# Patient Record
Sex: FEMALE | Race: WHITE
[De-identification: names, ages, dates, MRNs, and addresses within clinical notes are randomized per-mention and may not be internally consistent; named-entity substitution may affect disease eponyms.]

---

## 2018-03-28 ENCOUNTER — HOSPITAL ENCOUNTER (EMERGENCY)
Dept: HOSPITAL 97 - ER | Age: 68
Discharge: HOME | End: 2018-03-28
Payer: COMMERCIAL

## 2018-03-28 DIAGNOSIS — M79.661: Primary | ICD-10-CM

## 2018-03-28 DIAGNOSIS — Z88.0: ICD-10-CM

## 2018-03-28 DIAGNOSIS — E10.9: ICD-10-CM

## 2018-03-28 DIAGNOSIS — I10: ICD-10-CM

## 2018-03-28 PROCEDURE — 93971 EXTREMITY STUDY: CPT

## 2018-03-28 PROCEDURE — 99284 EMERGENCY DEPT VISIT MOD MDM: CPT

## 2018-03-28 PROCEDURE — 96372 THER/PROPH/DIAG INJ SC/IM: CPT

## 2018-03-28 NOTE — ER
Nurse's Notes                                                                                     

 [facility]                                                                

Name: Marie Gipson                                                                              

Age: 67 yrs                                                                                       

Sex: Female                                                                                       

: 1950                                                                                   

MRN: [id number]                                                                                   

Arrival Date: 2018                                                                          

Time: 12:51                                                                                       

Account#: [id number]                                                                            

Bed 14                                                                                            

Private MD:                                                                                       

Diagnosis: Pain in right knee;Pain in right lower leg;Pain in right leg;Type 1 diabetes           

  mellitus;Essential (primary) hypertension                                                       

                                                                                                  

Presentation:                                                                                     

                                                                                             

13:10 Presenting complaint: Patient states: Right leg pain x 1 month, worse over last 2 days. hb  

      Pain starts in upper calf and radiates to foot and hip. Denies injury. Transition of        

      care: patient was not received from another setting of care. Onset of symptoms is           

      unknown. Care prior to arrival: None.                                                       

13:10 Method Of Arrival: Ambulatory                                                           hb  

13:10 Acuity: USSIE 3                                                                           hb  

                                                                                                  

Triage Assessment:                                                                                

13:15 General: Appears in no apparent distress. uncomfortable, Behavior is calm, cooperative. hb  

      Pain: Pain currently is 8 out of 10 on a pain scale. Neuro: Level of Consciousness is       

      awake, alert, obeys commands, Oriented to person, place, time, situation.                   

      Cardiovascular: Capillary refill < 3 seconds Patient's skin is warm and dry.                

      Respiratory: Airway is patent Respiratory effort is even, unlabored, Respiratory            

      pattern is regular, symmetrical.                                                            

                                                                                                  

Historical:                                                                                       

- Allergies:                                                                                      

13:13 PENICILLINS;                                                                            hb  

- PMHx:                                                                                           

13:13 Hypertension; Diabetes - IDDM;                                                          hb  

                                                                                                  

- Immunization history:: Adult Immunizations up to date.                                          

- Social history:: Smoking status: Patient/guardian denies using tobacco.                         

- Family history:: not pertinent.                                                                 

                                                                                                  

                                                                                                  

Screenin:40 Abuse screen: Denies threats or abuse. Denies injuries from another. Nutritional        ph  

      screening: No deficits noted. Tuberculosis screening: No symptoms or risk factors           

      identified. Fall Risk No fall in past 12 months (0 pts). No secondary diagnosis (0          

      pts). IV access (20 points). Ambulatory Aid- None/Bed Rest/Nurse Assist (0 pts). Gait-      

      Normal/Bed Rest/Wheelchair (0 pts) Mental Status- Oriented to own ability (0 pts).          

      Total Youngblood Fall Scale indicates No Risk (0-24 pts).                                        

                                                                                                  

Assessment:                                                                                       

13:46 General: Appears in no apparent distress. comfortable, well groomed, Behavior is calm,  ph  

      cooperative, appropriate for age, Denies fever, feeling ill. Pain: Complains of pain in     

      posterior aspect of right knee Pain radiates to right calf. Neuro: Level of                 

      Consciousness is awake, alert, obeys commands, Oriented to person, place, time,             

      situation. Cardiovascular: Denies chest pain, lightheadedness, shortness of breath,         

      Capillary refill < 3 seconds in bilateral fingers toes Patient's skin is warm and dry.      

      Pulses are palpable in right dorsalis pedis artery and left dorsalis pedis artery.          

      Respiratory: Airway is patent Respiratory effort is even, unlabored. Derm: Skin is          

      intact, is healthy with good turgor, Skin is pink, warm \T\ dry. Musculoskeletal:           

      Circulation, motion, and sensation intact. Range of motion: intact in all extremities,      

      Swelling present in right knee Denies known injury.                                         

14:48 Reassessment: Patient appears in no apparent distress at this time. Patient and/or      ph  

      family updated on plan of care and expected duration. Pain level reassessed. Patient is     

      alert, oriented x 3, equal unlabored respirations, skin warm/dry/pink. Pt returned from     

      radiology, awaiting results.                                                                

15:46 Reassessment: Patient appears in no apparent distress at this time. Patient and/or      ph  

      family updated on plan of care and expected duration. Pain level reassessed. Patient is     

      alert, oriented x 3, equal unlabored respirations, skin warm/dry/pink. Knee immobilizer     

      placed to R leg per provider order, pt instructed to follow-up w/ orthopedist and           

      discharged home with family.                                                                

                                                                                                  

Vital Signs:                                                                                      

13:14  / 69; Pulse 64; Resp 16; Temp 99.2; Pulse Ox 98% on R/A; Weight 88.45 kg; Height hb  

      5 ft. 1 in. (154.94 cm); Pain 8/10;                                                         

14:15  / 72; Pulse 67; Resp 18; Pulse Ox 99% on R/A;                                    ph  

15:48  / 74; Pulse 61; Resp 18; Temp 98.0; Pulse Ox 99% on R/A; Pain 6/10;              ph  

13:14 Body Mass Index 36.84 (88.45 kg, 154.94 cm)                                             hb  

                                                                                                  

ED Course:                                                                                        

12:51 Patient arrived in ED.                                                                  as  

13:13 Triage completed.                                                                       hb  

13:14 Arm band placed on left wrist.                                                          hb  

13:18 Saad Lozada MD is Attending Physician.                                             will 

13:40 Kirsty Hernandez, RN is Primary Nurse.                                                    ph  

13:40 Patient has correct armband on for positive identification. Placed in gown. Bed in low  ph  

      position. Call light in reach. Side rails up X 1. Pulse ox on. NIBP on. Warm blanket        

      given.                                                                                      

14:16 Patient taken to ultrasound. via wheelchair.                                            hr  

14:16 Note: Pt. currently at Ultrasound - will bring to radiology when finished with Pt..     jb2 

14:32 Ultrasound completed. Patient tolerated well.                                           hr  

14:32 Patient moved to radiology via wheelchair.                                              hr  

14:33 Extremity Venous Uni Ltd In Process Unspecified.                                        EDMS

14:38 Patient moved to radiology via wheelchair.                                              jb2 

14:38 X-ray completed. Patient tolerated procedure well.                                      jb2 

14:39 Patient moved back from radiology.                                                      jb2 

14:39 Knee Right 3 View In Process Unspecified.                                               EDMS

15:23 Quentin Phillips MD is Referral Physician.                                              will 

15:47 No provider procedures requiring assistance completed. Patient did not have IV access   ph  

      during this emergency room visit. Knee immobilizer applied on right knee.                   

                                                                                                  

Administered Medications:                                                                         

14:07 Drug: Norco 10 mg-325 mg 1 tabs Route: PO;                                              ph  

15:49 Follow up: Response: No adverse reaction; Pain is decreased                             ph  

14:07 Drug: TORadol 60 mg Route: IM; Site: right vastus lateralis;                            ph  

15:49 Follow up: Response: No adverse reaction; Pain is decreased                             ph  

                                                                                                  

                                                                                                  

Outcome:                                                                                          

15:23 Discharge ordered by MD.                                                                will 

15:49 Discharged to home via wheelchair, with family.                                         ph  

15:49 Condition: good                                                                             

15:49 Discharge instructions given to patient, Instructed on discharge instructions, follow       

      up and referral plans. medication usage, Demonstrated understanding of instructions,        

      follow-up care, medications, Prescriptions given X 2.                                       

15:50 Patient left the ED.                                                                    ph  

                                                                                                  

Signatures:                                                                                       

Dispatcher [employer]                           EDMS                                                 

Saad Lozada MD MD cha Buechter, Jesse                              jb2                                                  

Rimma Monahan                                   hr                                                   

Jennifer Santos                             as                                                   

Kirsty Hernandez, RN                      RN   ph                                                   

Hannah Garcia, JULOI CÉSAR                     RN   hb                                                   

                                                                                                  

Corrections: (The following items were deleted from the chart)                                    

13:14 13:10 Presenting complaint: Patient states: Right leg pain x 1 month. Pain starts in    hb  

      upper calf and radiates to foot and hip hb                                                  

                                                                                                  

**************************************************************************************************

## 2018-03-28 NOTE — EDPHYS
Physician Documentation                                                                           

 [facility]                                                                

Name: Marie Gipson                                                                              

Age: 67 yrs                                                                                       

Sex: Female                                                                                       

: 1950                                                                                   

MRN: [id number]                                                                                   

Arrival Date: 2018                                                                          

Time: 12:51                                                                                       

Account#: [id number]                                                                            

Bed 14                                                                                            

Private MD:                                                                                       

ED Physician Saad Lozada                                                                      

HPI:                                                                                              

                                                                                             

13:58 This 67 yrs old  Female presents to ER via Ambulatory with complaints of Leg   will 

      Pain.                                                                                       

13:58 The patient presents with decreased range of motion, pain, that is acute. The           will 

      complaints affect the right knee. Context: The problem was sustained at an unknown          

      site. Onset: The symptoms/episode began/occurred 3 day(s) ago. Modifying factors: The       

      symptoms are alleviated by elevating leg, the symptoms are aggravated by nothing.           

      Associated signs and symptoms: The patient has no apparent associated signs or              

      symptoms. Treatment prior to arrival includes: no previous treatment. The patient has       

      not experienced similar symptoms in the past.                                               

                                                                                                  

Historical:                                                                                       

- Allergies:                                                                                      

13:13 PENICILLINS;                                                                            hb  

- PMHx:                                                                                           

13:13 Hypertension; Diabetes - IDDM;                                                          hb  

                                                                                                  

- Immunization history:: Adult Immunizations up to date.                                          

- Social history:: Smoking status: Patient/guardian denies using tobacco.                         

- Family history:: not pertinent.                                                                 

                                                                                                  

                                                                                                  

ROS:                                                                                              

13:58 Constitutional: Negative for fever, chills, and weight loss, Eyes: Negative for injury, will 

      pain, redness, and discharge, ENT: Negative for injury, pain, and discharge, Neck:          

      Negative for injury, pain, and swelling, Cardiovascular: Negative for chest pain,           

      palpitations, and edema, Respiratory: Negative for shortness of breath, cough,              

      wheezing, and pleuritic chest pain, Abdomen/GI: Negative for abdominal pain, nausea,        

      vomiting, diarrhea, and constipation, Back: Negative for injury and pain, : Negative      

      for injury, bleeding, discharge, and swelling, Skin: Negative for injury, rash, and         

      discoloration, Neuro: Negative for headache, weakness, numbness, tingling, and seizure,     

      Psych: Negative for depression, anxiety, suicide ideation, homicidal ideation, and          

      hallucinations, Allergy/Immunology: Negative for hives, rash, and allergies, Endocrine:     

      Negative for neck swelling, polydipsia, polyuria, polyphagia, and marked weight             

      changes, Hematologic/Lymphatic: Negative for swollen nodes, abnormal bleeding, and          

      unusual bruising.                                                                           

13:58 MS/extremity: Positive for decreased range of motion, pain, tenderness, of the              

      posterior aspect of right knee, right calf and right knee.                                  

                                                                                                  

Exam:                                                                                             

13:58 Constitutional:  This is a well developed, well nourished patient who is awake, alert,  will 

      and in no acute distress. Head/Face:  Normocephalic, atraumatic. Eyes:  Pupils equal        

      round and reactive to light, extra-ocular motions intact.  Lids and lashes normal.          

      Conjunctiva and sclera are non-icteric and not injected.  Cornea within normal limits.      

      Periorbital areas with no swelling, redness, or edema. ENT:  Nares patent. No nasal         

      discharge, no septal abnormalities noted.  Tympanic membranes are normal and external       

      auditory canals are clear.  Oropharynx with no redness, swelling, or masses, exudates,      

      or evidence of obstruction, uvula midline.  Mucous membranes moist. Neck:  Trachea          

      midline, no thyromegaly or masses palpated, and no cervical lymphadenopathy.  Supple,       

      full range of motion without nuchal rigidity, or vertebral point tenderness.  No            

      Meningismus. Chest/axilla:  Normal chest wall appearance and motion.  Nontender with no     

      deformity.  No lesions are appreciated. Cardiovascular:  Regular rate and rhythm with a     

      normal S1 and S2.  No gallops, murmurs, or rubs.  Normal PMI, no JVD.  No pulse             

      deficits. Respiratory:  Lungs have equal breath sounds bilaterally, clear to                

      auscultation and percussion.  No rales, rhonchi or wheezes noted.  No increased work of     

      breathing, no retractions or nasal flaring. Abdomen/GI:  Soft, non-tender, with normal      

      bowel sounds.  No distension or tympany.  No guarding or rebound.  No evidence of           

      tenderness throughout. Back:  No spinal tenderness.  No costovertebral tenderness.          

      Full range of motion. Female :  Normal external genitalia. Skin:  Warm, dry with          

      normal turgor.  Normal color with no rashes, no lesions, and no evidence of cellulitis.     

      Neuro:  Awake and alert, GCS 15, oriented to person, place, time, and situation.            

      Cranial nerves II-XII grossly intact.  Motor strength 5/5 in all extremities.  Sensory      

      grossly intact.  Cerebellar exam normal.  Normal gait. Psych:  Awake, alert, with           

      orientation to person, place and time.  Behavior, mood, and affect are within normal        

      limits.                                                                                     

13:58 Musculoskeletal/extremity: Extremities: noted in the right leg: decreased ROM, pain.        

13:58 Skin: Appearance: normal except for affected area, Color: normal in color, Temperature:     

      normal temperature, Moisture: normal moisture, petechiae, not noted, ecchymosis, not        

      noted.                                                                                      

13:58 Neuro: Orientation: is normal, appropriate for stated age, no acute changes.                

                                                                                                  

Vital Signs:                                                                                      

13:14  / 69; Pulse 64; Resp 16; Temp 99.2; Pulse Ox 98% on R/A; Weight 88.45 kg; Height hb  

      5 ft. 1 in. (154.94 cm); Pain 8/10;                                                         

14:15  / 72; Pulse 67; Resp 18; Pulse Ox 99% on R/A;                                    ph  

15:48  / 74; Pulse 61; Resp 18; Temp 98.0; Pulse Ox 99% on R/A; Pain 6/10;              ph  

13:14 Body Mass Index 36.84 (88.45 kg, 154.94 cm)                                             hb  

                                                                                                  

MDM:                                                                                              

13:18 Patient medically screened.                                                             [facility] 

14:01 Data reviewed: vital signs, nurses notes, radiologic studies, doppler, plain films.     [facility] 

                                                                                                  

                                                                                             

14:09 Order name: Extremity Venous Uni Ltd; Complete Time: 15:08                              [facility]

                                                                                             

14:09 Order name: Knee Right 3 View; Complete Time: 15:08                                     [facility]

                                                                                             

15:09 Order name: Knee Immobilizer; Complete Time: 15:20                                      [facility] 

                                                                                                  

Administered Medications:                                                                         

14:07 Drug: Norco 10 mg-325 mg 1 tabs Route: PO;                                              ph  

15:49 Follow up: Response: No adverse reaction; Pain is decreased                             ph  

14:07 Drug: TORadol 60 mg Route: IM; Site: right vastus lateralis;                            ph  

15:49 Follow up: Response: No adverse reaction; Pain is decreased                             ph  

                                                                                                  

                                                                                                  

Disposition:                                                                                      

18 15:23 Discharged to Home. Impression: Pain in right knee, Pain in right lower leg,       

  Pain in right leg, Type 1 diabetes mellitus, Essential (primary) hypertension.                  

- Condition is Stable.                                                                            

- Discharge Instructions: Type 2 Diabetes Mellitus, Adult, Hypertension, Knee Bracing,            

  Musculoskeletal Pain, Knee Pain, Hypertension, Easy-to-Read, Diabetes Mellitus and              

  Food, Type 2 Diabetes Mellitus, Adult, Easy-to-Read, Knee Pain, Easy-to-Read.                   

- Prescriptions for Tylenol- Codeine #3 300-30 mg Oral Tablet - take 2 tablet by ORAL             

  route every 6 hours As needed; 30 tablet. Motrin  mg Oral Tablet - take 2                 

  tablet by ORAL route every 6 hours As needed as needed with food; 30 tablet.                    

- Medication Reconciliation Form, Thank You Letter, Antibiotic Education, Prescription            

  Opioid Use form.                                                                                

- Follow up: Private Physician; When: 2 - 3 days; Reason: Recheck today's complaints,             

  Continuance of care, Re-evaluation by your physician. Follow up: Quentin Phillips MD;           

  When: 2 - 3 days; Reason: Recheck today's complaints, Re-evaluation by your physician.          

- Problem is new.                                                                                 

- Symptoms have improved.                                                                         

                                                                                                  

                                                                                                  

                                                                                                  

Signatures:                                                                                       

Dispatcher [employer]                           EDSaad Cooley MD MD cha Hall, Patricia, RN                      RN   Hannah Ibrahim RN                     RN                                                      

                                                                                                  

**************************************************************************************************

## 2018-03-28 NOTE — RAD REPORT
EXAM DESCRIPTION:  RAD - Knee Right 3 View - 3/28/2018 2:39 pm

 

CLINICAL HISTORY:  Right leg pain

 

FINDINGS:  No fracture or dislocation is seen.

## 2018-03-28 NOTE — RAD REPORT
EXAM DESCRIPTION:  VASExtremitabhilash Venous Uni Ltd3/28/2018 2:33 pm

 

CLINICAL HISTORY:  Right leg pain

 

COMPARISON:  None.

 

FINDINGS:  Right common femoral, superficial femoral, popliteal and right posterior tibial veins are 
compressible and demonstrate augmentation. Doppler demonstrates good flow.

 

A Kaplan cyst is not visualized

 

IMPRESSION:  No evidence of deep venous thrombosis involving the right lower extremity.

## 2021-11-11 ENCOUNTER — HOSPITAL ENCOUNTER (EMERGENCY)
Dept: HOSPITAL 97 - ER | Age: 71
Discharge: HOME | End: 2021-11-11
Payer: COMMERCIAL

## 2021-11-11 VITALS — TEMPERATURE: 98.8 F | SYSTOLIC BLOOD PRESSURE: 161 MMHG | DIASTOLIC BLOOD PRESSURE: 76 MMHG | OXYGEN SATURATION: 98 %

## 2021-11-11 DIAGNOSIS — R05.9: Primary | ICD-10-CM

## 2021-11-11 DIAGNOSIS — Z88.0: ICD-10-CM

## 2021-11-11 DIAGNOSIS — Z20.822: ICD-10-CM

## 2021-11-11 LAB — SARS-COV-2 RNA RESP QL NAA+PROBE: NEGATIVE

## 2021-11-11 PROCEDURE — 71045 X-RAY EXAM CHEST 1 VIEW: CPT

## 2021-11-11 PROCEDURE — 0240U: CPT

## 2021-11-11 PROCEDURE — 99284 EMERGENCY DEPT VISIT MOD MDM: CPT

## 2021-11-11 NOTE — ER
Nurse's Notes                                                                                     

 [facility]                                                                 

Name: Marie Gipson                                                                              

Age: 71 yrs                                                                                       

Sex: Female                                                                                       

: 1950                                                                                   

MRN: [id number]                                                                                   

Arrival Date: 2021                                                                          

Time: 10:40                                                                                       

Account#: [id number]                                                                            

Bed 14                                                                                            

Private MD: Lissy Escalona                                                                              

Diagnosis: Cough                                                                                  

                                                                                                  

Presentation:                                                                                     

                                                                                             

11:07 Chief complaint: Patient states: Cough x 4-5 days. Denies fever. Coronavirus screen:    ss  

      Client presents with at least one sign or symptom that may indicate coronavirus-19.         

      Standard/surgical mask placed on the client. Provider contacted for isolation               

      considerations. Ebola Screen: Patient denies exposure to infectious person. Patient         

      denies travel to an Ebola-affected area in the 21 days before illness onset. Initial        

      Sepsis Screen: Does the patient meet any 2 criteria? No. Patient's initial sepsis           

      screen is negative. Does the patient have a suspected source of infection? No.              

      Patient's initial sepsis screen is negative. Risk Assessment: Do you want to hurt           

      yourself or someone else? Patient reports no desire to harm self or others. Onset of        

      symptoms was 2021.                                                             

11:07 Method Of Arrival: Ambulatory                                                             

11:07 Acuity: SUSIE 3                                                                           ss  

                                                                                                  

Historical:                                                                                       

- Allergies:                                                                                      

11:11 PENICILLINS;                                                                            ss  

- PMHx:                                                                                           

11:11 Diabetes - IDDM; Hypertension;                                                          ss  

                                                                                                  

- Immunization history:: Adult Immunizations up to date.                                          

- Social history:: Smoking status: Patient denies any tobacco usage or history of.                

                                                                                                  

                                                                                                  

Screenin:13 Abuse screen: Denies threats or abuse. Denies injuries from another. Nutritional        jt3 

      screening: No deficits noted. Tuberculosis screening: No symptoms or risk factors           

      identified. Fall Risk None identified.                                                      

                                                                                                  

Assessment:                                                                                       

12:13 Pain: Denies pain. Cardiovascular: No deficits noted. Respiratory: Breath sounds are    jt3 

      clear bilaterally. Breath sounds with wheezes in right posterior upper lobe.                

      Respiratory: Reports cough that is.                                                         

                                                                                                  

Vital Signs:                                                                                      

10:49  / 82; Pulse 57; Resp 18; Temp 98.5(O); Pulse Ox 99% on R/A; Weight 86.18 kg;     mh5 

      Height 5 ft. 2 in. (157.48 cm); Pain 0/10;                                                  

11:07  / 76; Pulse 67; Resp 15; Temp 98.8(TE); Pulse Ox 98% on R/A; Weight 86.18 kg;      

      Height 5 ft. 1 in. (154.94 cm); Pain 0/10;                                                  

11:07 Body Mass Index 35.90 (86.18 kg, 154.94 cm)                                               

                                                                                                  

ED Course:                                                                                        

10:40 Patient arrived in ED.                                                                  am2 

10:40 Lissy Escalona MD is Private Physician.                                                      am2 

10:45 [facility]                                                        kb  

10:45 Rosa M Hayes MD is Attending Physician.                                           kb  

10:49 Jairo Narayan, RN is Primary Nurse.                                                   jt3 

10:51 Patient has correct armband on for positive identification. Placed in gown. Bed in low  mh5 

      position. Call light in reach. Side rails up X 1. Warm blanket given. Pulse ox on. NIBP     

      on.                                                                                         

10:58 COVID swab sent to lab. Flu and/or RSV swab sent to lab.                                5 

11:11 Triage completed.                                                                       ss  

11:11 Arm band placed on right wrist.                                                           

11:25 Chest Single View XRAY In Process Unspecified.                                          EDMS

12:13 No provider procedures requiring assistance completed.                                  jt3 

13:05 Patient did not have IV access during this emergency room visit.                          

                                                                                                  

Administered Medications:                                                                         

11:33 Drug: Tessalon Perle (benzonatate) 100 mg Route: PO;                                    jt3 

13:05 Follow up: Response: No adverse reaction                                                  

                                                                                                  

                                                                                                  

Outcome:                                                                                          

12:18 Discharge ordered by MD.                                                                kb  

13:05 Discharged to home ambulatory.                                                          ss  

13:05 Condition: good                                                                             

13:05 Discharge instructions given to patient, Instructed on discharge instructions, follow       

      up and referral plans. medication usage, Demonstrated understanding of instructions,        

      follow-up care, medications, Prescriptions given X 1.                                       

13:05 Patient left the ED.                                                                      

                                                                                                  

Signatures:                                                                                       

Dispatcher [employer]                           EDMS                                                 

Annia Fernandez FNP-C FNP-Ckb Smirch, Shelby, RN RN                                                      

Gilma Santos                              5                                                  

Iman Villanueva                               [facility]                                                  

Jairo Narayan, JULIO CÉSAR                     RN   jt3                                                  

                                                                                                  

Corrections: (The following items were deleted from the chart)                                    

11:25 10:58 SARS-COV-2 RT PCR+MOL.LAB.BRZ drawn and sent. 5                                 EDMS

11:25 10:58 Influenza Screen (A \T\ B)+BA.LAB.BRZ drawn and sent. mh5                           EDMS

                                                                                                  

**************************************************************************************************

## 2021-11-11 NOTE — EDPHYS
Physician Documentation                                                                           

 [facility]                                                                 

Name: Marie Gipson                                                                              

Age: 71 yrs                                                                                       

Sex: Female                                                                                       

: 1950                                                                                   

MRN: [id number]                                                                                   

Arrival Date: 2021                                                                          

Time: 10:40                                                                                       

Account#: [id number]                                                                            

Bed 14                                                                                            

Private MD: Lissy Escalona                                                                              

ED Physician Rosa M Hayes                                                                    

HPI:                                                                                              

                                                                                             

12:09 This 71 yrs old  Female presents to ER via Ambulatory with complaints of Cough.kb  

12:09 The patient or guardian reports cough, that is intermittent, described as moderate.     kb  

      Onset: The symptoms/episode began/occurred 5 day(s) ago. Severity of symptoms: At their     

      worst the symptoms were moderate, in the emergency department the symptoms are              

      unchanged. Modifying factors: The symptoms are alleviated by nothing, the symptoms are      

      aggravated by nothing. Associated signs and symptoms: The patient has no apparent           

      associated signs or symptoms. The patient has not experienced similar symptoms in the       

      past. The patient has not recently seen a physician. Pt reports cough for 5 days.           

      Denies fever, congestion.                                                                   

                                                                                                  

Historical:                                                                                       

- Allergies:                                                                                      

11:11 PENICILLINS;                                                                            ss  

- PMHx:                                                                                           

11:11 Diabetes - IDDM; Hypertension;                                                          ss  

                                                                                                  

- Immunization history:: Adult Immunizations up to date.                                          

- Social history:: Smoking status: Patient denies any tobacco usage or history of.                

                                                                                                  

                                                                                                  

ROS:                                                                                              

12:08 Constitutional: Negative for fever, chills, and weight loss.                            kb  

12:08 Respiratory: Positive for cough, Negative for dyspnea on exertion, hemoptysis,              

      orthopnea, pleurisy, shortness of breath, sputum production, wheezing.                      

12:08 All other systems are negative.                                                             

                                                                                                  

Exam:                                                                                             

12:08 Constitutional:  This is a well developed, well nourished patient who is awake, alert,  kb  

      and in no acute distress. Head/Face:  Normocephalic, atraumatic. ENT:  Moist Mucous         

      membranes Cardiovascular:  Regular rate and rhythm with a normal S1 and S2.  No             

      gallops, murmurs, or rubs.  No pulse deficits. Respiratory:  Respirations even and          

      unlabored. No increased work of breathing, no retractions or nasal flaring. Skin:           

      Warm, dry with normal turgor.  Normal color. MS/ Extremity:  Pulses equal, no cyanosis.     

       Neurovascular intact.  Full, normal range of motion. Neuro:  Awake and alert, GCS 15,      

      oriented to person, place, time, and situation. Moves all extremities. Normal gait.         

      Psych:  Awake, alert, with orientation to person, place and time.  Behavior, mood, and      

      affect are within normal limits.                                                            

                                                                                                  

Vital Signs:                                                                                      

10:49  / 82; Pulse 57; Resp 18; Temp 98.5(O); Pulse Ox 99% on R/A; Weight 86.18 kg;     mh5 

      Height 5 ft. 2 in. (157.48 cm); Pain 0/10;                                                  

11:07  / 76; Pulse 67; Resp 15; Temp 98.8(TE); Pulse Ox 98% on R/A; Weight 86.18 kg;    ss  

      Height 5 ft. 1 in. (154.94 cm); Pain 0/10;                                                  

11:07 Body Mass Index 35.90 (86.18 kg, 154.94 cm)                                             ss  

                                                                                                  

MDM:                                                                                              

10:46 Patient medically screened.                                                             kb  

12:08 Data reviewed: vital signs, nurses notes. Data interpreted: Pulse oximetry: on room air kb  

      is 98 %. Interpretation: normal.                                                            

12:18 Counseling: I had a detailed discussion with the patient and/or guardian regarding: the kb  

      historical points, exam findings, and any diagnostic results supporting the                 

      discharge/admit diagnosis, lab results, radiology results, the need for outpatient          

      follow up, a family practitioner, to return to the emergency department if symptoms         

      worsen or persist or if there are any questions or concerns that arise at home.             

                                                                                                  

                                                                                             

10:45 Order name: Chest Single View XRAY; Complete Time: 11:49                                kb  

                                                                                             

11:25 Order name: COVID-19/FLU A+B; Complete Time: 12:18                                      EDMS

                                                                                                  

Administered Medications:                                                                         

11:33 Drug: Tessalon Perle (benzonatate) 100 mg Route: PO;                                    jt3 

13:05 Follow up: Response: No adverse reaction                                                ss  

                                                                                                  

                                                                                                  

Disposition Summary:                                                                              

21 12:18                                                                                    

Discharge Ordered                                                                                 

      Location: Home                                                                          kb  

      Condition: Stable                                                                       kb  

      Diagnosis                                                                                   

        - Cough                                                                               kb  

      Followup:                                                                               kb  

        - With: Emergency Department                                                               

        - When: As needed                                                                          

        - Reason: Worsening of condition                                                           

      Followup:                                                                               kb  

        - With: Private Physician                                                                  

        - When: 2 - 3 days                                                                         

        - Reason: Recheck today's complaints, Continuance of care, Re-evaluation by your           

      physician                                                                                   

      Discharge Instructions:                                                                     

        - Discharge Summary Sheet                                                             kb  

        - Cough, Adult, Easy-to-Read                                                          kb  

      Forms:                                                                                      

        - Medication Reconciliation Form                                                      kb  

        - Thank You Letter                                                                    kb  

        - Antibiotic Education                                                                kb  

        - Prescription Opioid Use                                                             kb  

      Prescriptions:                                                                              

        - Tessalon Perles 100 mg Oral Capsule                                                      

            - take 1 capsule by ORAL route every 8 hours As needed; 15 capsule; Refills: 0,   kb  

      Product Selection Permitted                                                                 

Addendum:                                                                                         

11/15/2021                                                                                        

     06:38 Co-signature as Attending Physician, Rosa M Hayes MD.                               m
a2

                                                                                                  

Signatures:                                                                                       

Dispatcher [employer]                           EDMS                                                 

Annia Fernandez, PEPE-JUSTICE CANTU-Nini Trujillo, JULIO CÉSAR                      RN   ss                                                   

Rosa M Hayes MD MD   ma2                                                  

Jairo Narayan RN                     RN   jt3                                                  

                                                                                                  

Corrections: (The following items were deleted from the chart)                                    

                                                                                             

11:25 10:46 Influenza Screen (A \T\ B)+BA.LAB.BRZ ordered. EDMS                                 EDMS

11:25 10:46 SARS-COV-2 RT PCR+MOL.LAB.BRZ ordered. EDMS                                       EDMS

                                                                                                  

**************************************************************************************************

## 2021-11-11 NOTE — RAD REPORT
EXAM DESCRIPTION:  RAD - Chest Single View - 11/11/2021 11:25 am

 

CLINICAL HISTORY:  COUGH

 

COMPARISON:  No comparisons

 

FINDINGS:  Lines: None.

Lungs: No evidence of edema or pneumonia.

Pleural: No significant pleural effusions or pneumothorax.

Cardiac: The heart size is within normal limits.

Bones: No acute fractures.

Other:

 

IMPRESSION:  No acute cardiopulmonary disease.